# Patient Record
Sex: MALE
[De-identification: names, ages, dates, MRNs, and addresses within clinical notes are randomized per-mention and may not be internally consistent; named-entity substitution may affect disease eponyms.]

---

## 2022-11-07 ENCOUNTER — APPOINTMENT (OUTPATIENT)
Dept: UROLOGY | Facility: CLINIC | Age: 77
End: 2022-11-07

## 2022-11-07 VITALS
HEART RATE: 60 BPM | TEMPERATURE: 98.7 F | SYSTOLIC BLOOD PRESSURE: 148 MMHG | WEIGHT: 190 LBS | DIASTOLIC BLOOD PRESSURE: 78 MMHG | BODY MASS INDEX: 31.65 KG/M2 | OXYGEN SATURATION: 99 % | HEIGHT: 65 IN

## 2022-11-07 DIAGNOSIS — Z80.42 FAMILY HISTORY OF MALIGNANT NEOPLASM OF PROSTATE: ICD-10-CM

## 2022-11-07 DIAGNOSIS — Z78.9 OTHER SPECIFIED HEALTH STATUS: ICD-10-CM

## 2022-11-07 DIAGNOSIS — I10 ESSENTIAL (PRIMARY) HYPERTENSION: ICD-10-CM

## 2022-11-07 PROBLEM — Z00.00 ENCOUNTER FOR PREVENTIVE HEALTH EXAMINATION: Status: ACTIVE | Noted: 2022-11-07

## 2022-11-07 LAB
BILIRUB UR QL STRIP: NORMAL
CLARITY UR: CLEAR
COLLECTION METHOD: NORMAL
GLUCOSE UR-MCNC: 250
HCG UR QL: 0.2 EU/DL
HGB UR QL STRIP.AUTO: NORMAL
KETONES UR-MCNC: NORMAL
LEUKOCYTE ESTERASE UR QL STRIP: NORMAL
NITRITE UR QL STRIP: NORMAL
PH UR STRIP: 5.5
PROT UR STRIP-MCNC: NORMAL
SP GR UR STRIP: 1.02

## 2022-11-07 PROCEDURE — 99205 OFFICE O/P NEW HI 60 MIN: CPT | Mod: 25

## 2022-11-07 PROCEDURE — 81003 URINALYSIS AUTO W/O SCOPE: CPT | Mod: QW

## 2022-11-07 RX ORDER — MULTIVITAMIN
TABLET ORAL
Refills: 0 | Status: ACTIVE | COMMUNITY

## 2022-11-07 RX ORDER — LOSARTAN POTASSIUM 50 MG/1
50 TABLET, FILM COATED ORAL
Refills: 0 | Status: ACTIVE | COMMUNITY

## 2022-11-07 RX ORDER — ASPIRIN 81 MG
81 TABLET, DELAYED RELEASE (ENTERIC COATED) ORAL
Refills: 0 | Status: ACTIVE | COMMUNITY

## 2022-11-07 NOTE — ASSESSMENT
[FreeTextEntry1] : Mr. Baird presents for initial evaluation of intermittent Left flank pain in the setting abdominal US imaging (from [facility]) that shows severe L hydronephrosis likely secondary to ureteral obstruction. \par This is a chronic problem >15 years in the making that is progressing\par He has previously been treated years ago with some unknown endoscopic procedure to "drain a kidney cyst" without reports of a stent or nephrostomy tube. No history of nephrolithiasis or UTIs.\par \par Patient is accompanied by daughter and son-in-law as independent historians  \par International notes and labs reviewed with family\par \par UA today - 250 ng/dl; reviewed glucosuria with patient \par \par Abdominal US (10/22): imaging reviewed by me. Severe left hydro with some cortical thinning of left renal parenchyma. No ureteral dilation or stone noted. \par \par ddx UPJ obstruction (idiopathic vs. iatrogenic) vs. ureteral stricture vs. ureteral stone vs. renal cyst \par \par Recommendations\par -Cr\par -CT urogram\par -RTC afterwards to review results

## 2022-11-07 NOTE — HISTORY OF PRESENT ILLNESS
[FreeTextEntry1] : 77M presents for initial evaluation of left flank pain, hydronephrosis \par \par PMH significant for: HTN\par PSH significant for: BL Knee replacement \par Significant meds: losartan \par \par Patient reports >10 year-long history of intermittent L upper abdominal and flank pain\par It has been progressively worsening over time\par Reports mild to moderate level of distress \par Associated with increased fluid consumption, carbonated beverages, alcohol\par Previously treated with unknown endoscopic procedure to "drain urine". Denies history of stent, nephrostomy tube.\par Denies history of reconstructive surgery or nephrolithiasis \par \par

## 2022-11-07 NOTE — PHYSICAL EXAM
[General Appearance - Well Developed] : well developed [General Appearance - Well Nourished] : well nourished [Abdomen Soft] : soft [Abdomen Tenderness] : non-tender [Costovertebral Angle Tenderness] : no ~M costovertebral angle tenderness [FreeTextEntry1] : obese

## 2022-11-08 ENCOUNTER — TRANSCRIPTION ENCOUNTER (OUTPATIENT)
Age: 77
End: 2022-11-08

## 2022-11-08 LAB
ALBUMIN SERPL ELPH-MCNC: 4.3 G/DL
ALP BLD-CCNC: 80 U/L
ALT SERPL-CCNC: 27 U/L
ANION GAP SERPL CALC-SCNC: 12 MMOL/L
AST SERPL-CCNC: 20 U/L
BILIRUB SERPL-MCNC: 0.3 MG/DL
BUN SERPL-MCNC: 15 MG/DL
CALCIUM SERPL-MCNC: 9.3 MG/DL
CHLORIDE SERPL-SCNC: 104 MMOL/L
CO2 SERPL-SCNC: 25 MMOL/L
CREAT SERPL-MCNC: 0.86 MG/DL
EGFR: 89 ML/MIN/1.73M2
GLUCOSE SERPL-MCNC: 112 MG/DL
POTASSIUM SERPL-SCNC: 4 MMOL/L
PROT SERPL-MCNC: 7.2 G/DL
SODIUM SERPL-SCNC: 142 MMOL/L

## 2022-11-21 ENCOUNTER — APPOINTMENT (OUTPATIENT)
Dept: CT IMAGING | Facility: HOSPITAL | Age: 77
End: 2022-11-21

## 2022-11-21 ENCOUNTER — OUTPATIENT (OUTPATIENT)
Dept: OUTPATIENT SERVICES | Facility: HOSPITAL | Age: 77
LOS: 1 days | End: 2022-11-21
Payer: MEDICARE

## 2022-11-21 ENCOUNTER — RESULT REVIEW (OUTPATIENT)
Age: 77
End: 2022-11-21

## 2022-11-21 DIAGNOSIS — N13.30 UNSPECIFIED HYDRONEPHROSIS: ICD-10-CM

## 2022-11-21 PROCEDURE — 74178 CT ABD&PLV WO CNTR FLWD CNTR: CPT

## 2022-11-21 PROCEDURE — 74178 CT ABD&PLV WO CNTR FLWD CNTR: CPT | Mod: 26

## 2022-11-28 ENCOUNTER — APPOINTMENT (OUTPATIENT)
Dept: UROLOGY | Facility: CLINIC | Age: 77
End: 2022-11-28

## 2022-11-28 VITALS
HEIGHT: 65 IN | BODY MASS INDEX: 32.99 KG/M2 | WEIGHT: 198 LBS | SYSTOLIC BLOOD PRESSURE: 157 MMHG | DIASTOLIC BLOOD PRESSURE: 78 MMHG | HEART RATE: 74 BPM | OXYGEN SATURATION: 96 % | TEMPERATURE: 97.1 F

## 2022-11-28 DIAGNOSIS — N13.5 CROSSING VESSEL AND STRICTURE OF URETER W/OUT HYDRONEPHROSIS: ICD-10-CM

## 2022-11-28 DIAGNOSIS — N13.30 UNSPECIFIED HYDRONEPHROSIS: ICD-10-CM

## 2022-11-28 PROCEDURE — 99213 OFFICE O/P EST LOW 20 MIN: CPT

## 2022-11-28 NOTE — ASSESSMENT
[FreeTextEntry1] : Mr. Baird presents for follow up evaluation of intermittent Left flank pain in the setting abdominal US imaging (from [facility]) that shows severe L hydronephrosis likely secondary to ureteral obstruction. \par This is a chronic problem >15 years in the making that is progressing\par He has previously been treated years ago with some unknown endoscopic procedure to "drain a kidney cyst" without reports of a stent or nephrostomy tube. No history of nephrolithiasis or UTIs.\par \par Patient is accompanied by daughter and son-in-law as independent historians  \par International notes and labs reviewed with family\par \par CTU (11/21/22): imaging reviewed by me. L proximal ureteral vs. UPJ obstruction. Possible overlying extra-renal pelvis. Contrast not seen draining into the ureter. Mild L renal atrophy. No stones, cyst, or renal masses. \par \par ddx UPJ obstruction (idiopathic vs. iatrogenic) vs. ureteral stricture vs. ureothelial lesion \par \par Recommendations\par -will refer to Dr. Adolfo Rendon for further evaluation

## 2022-11-28 NOTE — HISTORY OF PRESENT ILLNESS
[FreeTextEntry1] : 77M presents for initial evaluation of left flank pain, hydronephrosis \par \par PMH significant for: HTN\par PSH significant for: BL Knee replacement \par Significant meds: losartan \par \par 11/7/22\par Patient reports >10 year-long history of intermittent L upper abdominal and flank pain\par It has been progressively worsening over time\par Reports mild to moderate level of distress \par Associated with increased fluid consumption, carbonated beverages, alcohol\par Previously treated with unknown endoscopic procedure to "drain urine". Denies history of stent, nephrostomy tube.\par Denies history of reconstructive surgery or nephrolithiasis \par \par 11/28/22\par CTU shows L hydroureter and pelviectasis, mild L renal atrophy. No contrast travels into the L ureter. 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 stricture vs. obstructing lesion. Prominent BL external iliac lymph nodes  \par \par

## 2023-01-03 ENCOUNTER — APPOINTMENT (OUTPATIENT)
Dept: UROLOGY | Facility: CLINIC | Age: 78
End: 2023-01-03